# Patient Record
Sex: FEMALE
[De-identification: names, ages, dates, MRNs, and addresses within clinical notes are randomized per-mention and may not be internally consistent; named-entity substitution may affect disease eponyms.]

---
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
## 2020-09-11 ENCOUNTER — APPOINTMENT (OUTPATIENT)
Dept: PEDIATRICS | Facility: CLINIC | Age: 20
End: 2020-09-11

## 2020-10-25 ENCOUNTER — TRANSCRIPTION ENCOUNTER (OUTPATIENT)
Age: 20
End: 2020-10-25

## 2020-11-09 ENCOUNTER — APPOINTMENT (OUTPATIENT)
Dept: FAMILY MEDICINE | Facility: CLINIC | Age: 20
End: 2020-11-09

## 2020-12-16 PROBLEM — Z01.419 ENCOUNTER FOR ANNUAL ROUTINE GYNECOLOGICAL EXAMINATION: Status: RESOLVED | Noted: 2018-06-01 | Resolved: 2020-12-16

## 2023-05-18 ENCOUNTER — APPOINTMENT (OUTPATIENT)
Dept: DERMATOLOGY | Facility: CLINIC | Age: 23
End: 2023-05-18
Payer: COMMERCIAL

## 2023-05-18 PROCEDURE — 99202 OFFICE O/P NEW SF 15 MIN: CPT

## 2023-05-18 RX ORDER — DESOGESTREL AND ETHINYL ESTRADIOL 0.15-0.03
0.15-3 KIT ORAL DAILY
Qty: 3 | Refills: 3 | Status: DISCONTINUED | COMMUNITY
Start: 2018-06-01 | End: 2023-05-18

## 2023-05-18 RX ORDER — CLINDAMYCIN PHOSPHATE 10 MG/ML
1 LOTION TOPICAL
Qty: 60 | Refills: 0 | Status: DISCONTINUED | COMMUNITY
Start: 2018-04-11 | End: 2023-05-18

## 2023-05-18 RX ORDER — NORGESTIMATE AND ETHINYL ESTRADIOL 7DAYSX3 28
0.18/0.215/0.25 KIT ORAL DAILY
Qty: 3 | Refills: 3 | Status: DISCONTINUED | COMMUNITY
Start: 2018-09-11 | End: 2023-05-18

## 2023-05-18 NOTE — PHYSICAL EXAM
[Alert] : alert [Oriented x 3] : ~L oriented x 3 [Well Nourished] : well nourished [FreeTextEntry3] : Type II skin\par \par Posterior neck: 5 x 3 soft skin colored freely movable mass with overlying changes of acanthosis nigricans

## 2023-05-18 NOTE — HISTORY OF PRESENT ILLNESS
[FreeTextEntry1] : Bump on neck [de-identified] : First visit for 22-year-old white female presenting with a several month history of a "cyst" on the right posterior neck.  Periodically enlarges and becomes tender.\par \par Note: Patient treated with a course of Accutane in 2022 with improvement of her acne.

## 2023-05-19 ENCOUNTER — NON-APPOINTMENT (OUTPATIENT)
Age: 23
End: 2023-05-19

## 2023-08-08 ENCOUNTER — APPOINTMENT (OUTPATIENT)
Dept: PLASTIC SURGERY | Facility: CLINIC | Age: 23
End: 2023-08-08
Payer: COMMERCIAL

## 2023-08-08 VITALS
SYSTOLIC BLOOD PRESSURE: 130 MMHG | HEIGHT: 62.5 IN | RESPIRATION RATE: 14 BRPM | BODY MASS INDEX: 30.5 KG/M2 | DIASTOLIC BLOOD PRESSURE: 80 MMHG | OXYGEN SATURATION: 98 % | HEART RATE: 92 BPM | WEIGHT: 170 LBS | TEMPERATURE: 97.7 F

## 2023-08-08 PROCEDURE — 99203 OFFICE O/P NEW LOW 30 MIN: CPT

## 2023-08-16 NOTE — HISTORY OF PRESENT ILLNESS
[FreeTextEntry1] : 23 yo female presents to office for consult regarding posterior neck mass and further treatment options.  Ms. FABIÁN VELAZQUEZ is a 22 old female with no past medical history who presents to discuss a mass on her posterior neck which has been present for many months. She feels the mass has been slowly growing and becoming increasingly more painful. She is worried about its etiology and would like to discuss excision.  non smoker no anticoagulation or bleeding disorders

## 2023-08-16 NOTE — ASSESSMENT
[FreeTextEntry1] : Ms. FABIÁN VELAZQUEZ is a 23 yo female presenting for a consultation for evaluation of a posterior neck mass  The patient is scheduled for neoplasm extirpation. I explained that following extirpation there will be a full thickness defect of the involved area. The reconstructive options will be based on the defect size and surrounding tissue laxity of the involved area. Primary closure is only possible for smaller defects. For larger defects, local tissue rearrangement or skin grafting may be necessary. The patient was explained the risks of each option. Risks following layered primary closure or local tissue rearrangement include wound dehiscence, contour irregularity, bleeding, infection, and parasthesia. Risks following skin grafting include wound dehiscence, skin graft nonadherence (partial or complete), contour irregularity, bleeding, infection, parasthesia, and donor site complications.  The patient understands all the risks and has provided informed consent all patient's questions were answered throughout the visit

## 2024-01-23 ENCOUNTER — APPOINTMENT (OUTPATIENT)
Dept: PLASTIC SURGERY | Facility: CLINIC | Age: 24
End: 2024-01-23
Payer: COMMERCIAL

## 2024-01-23 VITALS
SYSTOLIC BLOOD PRESSURE: 127 MMHG | BODY MASS INDEX: 28.71 KG/M2 | HEIGHT: 62.5 IN | WEIGHT: 160 LBS | OXYGEN SATURATION: 96 % | TEMPERATURE: 98.9 F | HEART RATE: 99 BPM | DIASTOLIC BLOOD PRESSURE: 79 MMHG

## 2024-01-23 DIAGNOSIS — D17.0 BENIGN LIPOMATOUS NEOPLASM OF SKIN AND SUBCUTANEOUS TISSUE OF HEAD, FACE AND NECK: ICD-10-CM

## 2024-01-23 PROCEDURE — 99214 OFFICE O/P EST MOD 30 MIN: CPT

## 2024-02-05 ENCOUNTER — APPOINTMENT (OUTPATIENT)
Dept: PLASTIC SURGERY | Facility: AMBULATORY SURGERY CENTER | Age: 24
End: 2024-02-05

## 2024-02-05 PROBLEM — D17.0 LIPOMA OF SKIN AND SUBCUTANEOUS TISSUE OF NECK: Status: ACTIVE | Noted: 2023-05-18

## 2024-02-05 NOTE — HISTORY OF PRESENT ILLNESS
[FreeTextEntry1] : 22 yo female presents to office for consult regarding posterior neck mass and further treatment options. Ms. FABIÁN VELAZQUEZ is a 23 old female with no past medical history who presents to discuss a mass on her posterior neck which has been present for many months. She feels the mass has been slowly growing and becoming increasingly more painful. She is worried about its etiology and would like to discuss excision. She explains that it has recently became more painful, and has "popped" and is now draining material/fluid. She currently has a surgical date for excision 2/5/24  non smoker no anticoagulation or bleeding disorders

## 2024-02-05 NOTE — ASSESSMENT
[FreeTextEntry1] : Ms. FABIÁN VELAZQUEZ is a 22 yo female presenting for a consultation for evaluation of a posterior neck mass  Instructed the patient to keep area clean and dry, and to allow for more healing prior to continuing with surgery. Her current surgical date will be postponed until fully healed. monitor for redness, swelling, fever, chills she is encouraged to call if there are any changes RTO after her trip for follow up appointment  all patient's questions were answered throughout the visit.

## 2024-02-29 ENCOUNTER — APPOINTMENT (OUTPATIENT)
Dept: PLASTIC SURGERY | Facility: CLINIC | Age: 24
End: 2024-02-29

## 2024-03-14 ENCOUNTER — APPOINTMENT (OUTPATIENT)
Dept: PLASTIC SURGERY | Facility: CLINIC | Age: 24
End: 2024-03-14
Payer: COMMERCIAL

## 2024-03-14 PROCEDURE — 99214 OFFICE O/P EST MOD 30 MIN: CPT

## 2024-03-14 NOTE — ASSESSMENT
[FreeTextEntry1] : Ms. FABIÁN VEALZQUEZ is a 22 yo female presenting for a consultation for evaluation of a posterior neck mass  Patient is well healed and we may proceed with excising the neck mass.   The patient is scheduled for neoplasm extirpation.  I explained that following extirpation there will be a full thickness defect of the involved area.  The reconstructive options will be based on the defect size and surrounding tissue laxity of the involved area.  Primary closure is only possible for smaller defects.  For larger defects, local tissue rearrangement or skin grafting may be necessary.  The patient was explained the risks of each option. Risks following layered primary closure or local tissue rearrangement include wound dehiscence, contour irregularity, bleeding, infection, and parasthesia.  Risks following skin grafting include wound dehiscence, skin graft nonadherence (partial or complete), contour irregularity, bleeding, infection, parasthesia, and donor site complications.   The patient understands all the risks and has provided informed consent

## 2024-03-14 NOTE — HISTORY OF PRESENT ILLNESS
[FreeTextEntry1] : 24 yo female presents to office for consult regarding posterior neck mass and further treatment options. Ms. FABIÁN VELAZQUEZ is a 23 old female with no past medical history who presents to discuss a mass on her posterior neck which has been present for many months. She feels the mass has been slowly growing and becoming increasingly more painful. She is worried about its etiology and would like to discuss excision. She explains that it has recently became more painful, and has "popped" and is now draining material/fluid. She currently has a surgical date for excision 2/5/24 that was cancelled due to her mass needing additional time to heal  She presents today to discuss surgical excision  non smoker no anticoagulation or bleeding disorders

## 2024-04-11 RX ORDER — OXYCODONE 5 MG/1
5 TABLET ORAL
Qty: 10 | Refills: 0 | Status: ACTIVE | COMMUNITY
Start: 2024-04-11 | End: 1900-01-01

## 2024-04-14 ENCOUNTER — RESULT REVIEW (OUTPATIENT)
Age: 24
End: 2024-04-14

## 2024-04-15 ENCOUNTER — APPOINTMENT (OUTPATIENT)
Dept: PLASTIC SURGERY | Facility: AMBULATORY SURGERY CENTER | Age: 24
End: 2024-04-15
Payer: COMMERCIAL

## 2024-04-15 PROCEDURE — 21554 EXC NECK TUM DEEP 5 CM/>: CPT

## 2024-04-15 PROCEDURE — 14041 TIS TRNFR F/C/C/M/N/A/G/H/F: CPT

## 2024-04-25 ENCOUNTER — APPOINTMENT (OUTPATIENT)
Dept: PLASTIC SURGERY | Facility: CLINIC | Age: 24
End: 2024-04-25
Payer: COMMERCIAL

## 2024-04-25 VITALS
OXYGEN SATURATION: 97 % | SYSTOLIC BLOOD PRESSURE: 124 MMHG | DIASTOLIC BLOOD PRESSURE: 78 MMHG | TEMPERATURE: 98.2 F | HEART RATE: 103 BPM

## 2024-04-25 DIAGNOSIS — L72.9 FOLLICULAR CYST OF THE SKIN AND SUBCUTANEOUS TISSUE, UNSPECIFIED: ICD-10-CM

## 2024-04-25 PROCEDURE — 99024 POSTOP FOLLOW-UP VISIT: CPT

## 2024-04-25 NOTE — PHYSICAL EXAM
[NI] : Normal [de-identified] : incisions clean no signs of infection drainage noted from lateral incision site with narrow dehiscence measuring 0.5 cm wide x 2 cm long  superior circular wound above larger incision site measuring 1 cm x 1cm, overlying scab no palpable fluid collections

## 2024-04-25 NOTE — ASSESSMENT
[FreeTextEntry1] : Ms. FABIÁN VELAZQUEZ is a 22 yo female presenting for a consultation for evaluation of a posterior neck mass  Patient is well; seen and examined by Dr. Menjivar Instructed the patient to keep area clean and dry, and to replace gauze dressings twice daily.  monitor for redness, swelling, fever, chills she is encouraged to call if there are any changes RTO 2 weeks all patient's questions were answered throughout the visit.

## 2024-04-25 NOTE — HISTORY OF PRESENT ILLNESS
[FreeTextEntry1] : 22 yo female presents to office for consult regarding posterior neck mass and further treatment options. Ms. FABIÁN VELAZQUEZ is a 23 old female with no past medical history who presents to discuss a mass on her posterior neck which has been present for many months. She feels the mass has been slowly growing and becoming increasingly more painful. She is worried about its etiology and would like to discuss excision. She explains that it has recently became more painful, and has "popped" and is now draining material/fluid. She currently has a surgical date for excision 2/5/24 that was cancelled due to her mass needing additional time to heal  She contacted the office yesterday with concerns of "drainage" from her incision sites. She denies having any fevers, chills, nausea or diarrhea. She is here for her first post-op visit and evaluation   non smoker no anticoagulation or bleeding disorders

## 2024-04-30 ENCOUNTER — APPOINTMENT (OUTPATIENT)
Dept: PLASTIC SURGERY | Facility: CLINIC | Age: 24
End: 2024-04-30
Payer: COMMERCIAL

## 2024-04-30 VITALS
OXYGEN SATURATION: 97 % | HEART RATE: 113 BPM | DIASTOLIC BLOOD PRESSURE: 89 MMHG | TEMPERATURE: 98.7 F | SYSTOLIC BLOOD PRESSURE: 135 MMHG

## 2024-04-30 PROCEDURE — 99024 POSTOP FOLLOW-UP VISIT: CPT

## 2024-04-30 NOTE — HISTORY OF PRESENT ILLNESS
[FreeTextEntry1] : 24 yo female presents to office for consult regarding posterior neck mass and further treatment options. Ms. FABIÁN VELAZQUEZ is a 23 old female with no past medical history who presents to discuss a mass on her posterior neck which has been present for many months. She feels the mass has been slowly growing and becoming increasingly more painful. She is worried about its etiology and would like to discuss excision. She explains that it has recently became more painful, and has "popped" and is now draining material/fluid. She currently has a surgical date for excision 2/5/24 that was cancelled due to her mass needing additional time to heal  She admits to the incision separation appearing "larger" and has still had drainage. She denies having any fevers, chills, nausea or diarrhea.  non smoker no anticoagulation or bleeding disorders

## 2024-04-30 NOTE — ASSESSMENT
[FreeTextEntry1] : Ms. FABIÁN VELAZQUEZ is a 22 yo female presenting for a consultation for evaluation of a posterior neck mass  Patient is well Instructed the patient to keep area clean and dry, and to perform wet to dry dressings twice daily Discussed the next steps in the process of wound healing, which includes medihoney application to area of concern Supplies given to patient. She may switch from wet to dry dressings  monitor for redness, swelling, fever, chills she is encouraged to call if there are any changes RTO 1 w all patient's questions were answered throughout the visit.

## 2024-04-30 NOTE — PHYSICAL EXAM
[NI] : Normal [de-identified] : incisions clean no signs of infection drainage noted from lateral incision site with dehiscence measuring 5 cm x 4 cm with granulation tissue noted at the base superior circular wound superior to larger incision site measuring 1 cm x 1cm no palpable fluid collections

## 2024-05-09 ENCOUNTER — APPOINTMENT (OUTPATIENT)
Dept: PLASTIC SURGERY | Facility: CLINIC | Age: 24
End: 2024-05-09
Payer: COMMERCIAL

## 2024-05-09 VITALS
HEIGHT: 62.5 IN | SYSTOLIC BLOOD PRESSURE: 128 MMHG | WEIGHT: 160 LBS | HEART RATE: 102 BPM | BODY MASS INDEX: 28.71 KG/M2 | OXYGEN SATURATION: 96 % | DIASTOLIC BLOOD PRESSURE: 84 MMHG

## 2024-05-09 PROCEDURE — 99024 POSTOP FOLLOW-UP VISIT: CPT

## 2024-05-15 NOTE — HISTORY OF PRESENT ILLNESS
[FreeTextEntry1] : Ms. FABIÁN VELAZQUEZ is a 23 year old female with past medical history of posterior neck mass who presents now s/p excision of neck mass 4/15/24.  She subsequently had dehiscence of her neck incision.  She has been doing wet to dry dressings twice daily, she presents for wound check   Denies fever, chills, LE pain/edema

## 2024-05-15 NOTE — ASSESSMENT
[FreeTextEntry1] : 22 yo female s/p neck mass excision 4/15/24 doing ok discussed to continue wet to dry dressing changes daily after showers monitor for redness, swelling, fever, chills she is encouraged to call if there are any changes all pt questions answered

## 2024-05-15 NOTE — PHYSICAL EXAM
[NI] : Normal [de-identified] : posterior neck incision with cental dehiscence measuring 3 cm x 1 cm, minimal fibrin at base, no surrounding signs of infection minimal drainage noted

## 2024-05-16 ENCOUNTER — APPOINTMENT (OUTPATIENT)
Dept: PLASTIC SURGERY | Facility: CLINIC | Age: 24
End: 2024-05-16
Payer: COMMERCIAL

## 2024-05-16 VITALS
DIASTOLIC BLOOD PRESSURE: 80 MMHG | SYSTOLIC BLOOD PRESSURE: 128 MMHG | HEART RATE: 106 BPM | TEMPERATURE: 98.1 F | OXYGEN SATURATION: 98 %

## 2024-05-16 PROCEDURE — 99024 POSTOP FOLLOW-UP VISIT: CPT

## 2024-05-16 NOTE — ASSESSMENT
[FreeTextEntry1] : 22 yo female s/p neck mass excision 4/15/24 doing well silver nitrate applied to hyper granulation tissue discussed applying medi honey to wound daily after showers monitor for redness, swelling, fever, chills she is encouraged to call if there are any changes all pt questions answered

## 2024-05-16 NOTE — PHYSICAL EXAM
[NI] : Normal [de-identified] : posterior neck incision with cental dehiscence measuring 2 cm x 1 cm, hyper-granulation tissue, no surrounding signs of infection minimal drainage noted

## 2024-05-30 ENCOUNTER — APPOINTMENT (OUTPATIENT)
Dept: PLASTIC SURGERY | Facility: CLINIC | Age: 24
End: 2024-05-30
Payer: COMMERCIAL

## 2024-05-30 VITALS
TEMPERATURE: 98 F | DIASTOLIC BLOOD PRESSURE: 80 MMHG | WEIGHT: 160 LBS | SYSTOLIC BLOOD PRESSURE: 120 MMHG | HEIGHT: 62 IN | BODY MASS INDEX: 29.44 KG/M2 | HEART RATE: 110 BPM | OXYGEN SATURATION: 96 %

## 2024-05-30 DIAGNOSIS — R22.1 LOCALIZED SWELLING, MASS AND LUMP, NECK: ICD-10-CM

## 2024-05-30 PROCEDURE — 99024 POSTOP FOLLOW-UP VISIT: CPT

## 2024-05-30 NOTE — ASSESSMENT
Care of patient assummed   [FreeTextEntry1] : 24 yo female s/p neck mass excision 4/15/24  doing well medihoney applied to healing wound and covered with gauze discussed continuing to apply medi honey to wound daily after showers monitor for redness, swelling, fever, chills she is encouraged to call if there are any changes RTO 2 weeks for final check all pt questions answered.

## 2024-05-30 NOTE — PHYSICAL EXAM
[de-identified] : posterior neck incision with cental dehiscence measuring 1 cm x 1 cm, no surrounding signs of infection minimal drainage noted

## 2024-06-13 ENCOUNTER — APPOINTMENT (OUTPATIENT)
Dept: PLASTIC SURGERY | Facility: CLINIC | Age: 24
End: 2024-06-13

## 2025-08-28 ENCOUNTER — NON-APPOINTMENT (OUTPATIENT)
Age: 25
End: 2025-08-28

## 2025-08-28 ENCOUNTER — APPOINTMENT (OUTPATIENT)
Dept: ENDOCRINOLOGY | Facility: CLINIC | Age: 25
End: 2025-08-28
Payer: MEDICAID

## 2025-08-28 VITALS
HEART RATE: 112 BPM | OXYGEN SATURATION: 98 % | SYSTOLIC BLOOD PRESSURE: 118 MMHG | HEIGHT: 62 IN | WEIGHT: 181 LBS | BODY MASS INDEX: 33.31 KG/M2 | TEMPERATURE: 97.6 F | RESPIRATION RATE: 14 BRPM | DIASTOLIC BLOOD PRESSURE: 78 MMHG

## 2025-08-28 DIAGNOSIS — R79.89 OTHER SPECIFIED ABNORMAL FINDINGS OF BLOOD CHEMISTRY: ICD-10-CM

## 2025-08-28 DIAGNOSIS — N92.6 IRREGULAR MENSTRUATION, UNSPECIFIED: ICD-10-CM

## 2025-08-28 DIAGNOSIS — Z83.3 FAMILY HISTORY OF DIABETES MELLITUS: ICD-10-CM

## 2025-08-28 DIAGNOSIS — L70.9 ACNE, UNSPECIFIED: ICD-10-CM

## 2025-08-28 DIAGNOSIS — E66.9 OBESITY, UNSPECIFIED: ICD-10-CM

## 2025-08-28 PROCEDURE — 99204 OFFICE O/P NEW MOD 45 MIN: CPT

## 2025-08-28 RX ORDER — TIRZEPATIDE 5 MG/.5ML
5 INJECTION, SOLUTION SUBCUTANEOUS
Qty: 1 | Refills: 3 | Status: ACTIVE | COMMUNITY
Start: 2025-08-28 | End: 1900-01-01

## 2025-08-28 RX ORDER — TIRZEPATIDE 2.5 MG/.5ML
2.5 INJECTION, SOLUTION SUBCUTANEOUS
Qty: 1 | Refills: 0 | Status: ACTIVE | COMMUNITY
Start: 2025-08-28 | End: 1900-01-01

## 2025-08-31 LAB
ALBUMIN SERPL ELPH-MCNC: 4.3 G/DL
ALP BLD-CCNC: 57 U/L
ALT SERPL-CCNC: 16 U/L
ANION GAP SERPL CALC-SCNC: 12 MMOL/L
AST SERPL-CCNC: 16 U/L
BASOPHILS # BLD AUTO: 0.03 K/UL
BASOPHILS NFR BLD AUTO: 0.4 %
BILIRUB SERPL-MCNC: 0.4 MG/DL
BUN SERPL-MCNC: 11 MG/DL
CALCIUM SERPL-MCNC: 9.2 MG/DL
CHLORIDE SERPL-SCNC: 106 MMOL/L
CHOLEST SERPL-MCNC: 130 MG/DL
CO2 SERPL-SCNC: 22 MMOL/L
CREAT SERPL-MCNC: 0.68 MG/DL
EGFRCR SERPLBLD CKD-EPI 2021: 124 ML/MIN/1.73M2
EOSINOPHIL # BLD AUTO: 0.22 K/UL
EOSINOPHIL NFR BLD AUTO: 3.1 %
ESTIMATED AVERAGE GLUCOSE: 108 MG/DL
GLUCOSE SERPL-MCNC: 86 MG/DL
HBA1C MFR BLD HPLC: 5.4 %
HCT VFR BLD CALC: 38.8 %
HDLC SERPL-MCNC: 35 MG/DL
HGB BLD-MCNC: 12.2 G/DL
IMM GRANULOCYTES NFR BLD AUTO: 0.3 %
LDLC SERPL-MCNC: 76 MG/DL
LYMPHOCYTES # BLD AUTO: 2.37 K/UL
LYMPHOCYTES NFR BLD AUTO: 33.2 %
MAN DIFF?: NORMAL
MCHC RBC-ENTMCNC: 25.7 PG
MCHC RBC-ENTMCNC: 31.4 G/DL
MCV RBC AUTO: 81.7 FL
MONOCYTES # BLD AUTO: 0.33 K/UL
MONOCYTES NFR BLD AUTO: 4.6 %
NEUTROPHILS # BLD AUTO: 4.17 K/UL
NEUTROPHILS NFR BLD AUTO: 58.4 %
NONHDLC SERPL-MCNC: 95 MG/DL
PLATELET # BLD AUTO: 288 K/UL
POTASSIUM SERPL-SCNC: 4.2 MMOL/L
PROT SERPL-MCNC: 7 G/DL
RBC # BLD: 4.75 M/UL
RBC # FLD: 14.3 %
SODIUM SERPL-SCNC: 140 MMOL/L
THYROGLOB AB SERPL-ACNC: 19.8 IU/ML
THYROPEROXIDASE AB SERPL IA-ACNC: 327 IU/ML
TRIGL SERPL-MCNC: 100 MG/DL
TSH RECEPTOR AB: <1.1 IU/L
TSH SERPL-ACNC: 3.95 UIU/ML
WBC # FLD AUTO: 7.14 K/UL

## 2025-09-02 LAB
TESTOST FREE SERPL-MCNC: 8.4 PG/ML
TESTOST SERPL-MCNC: 26.4 NG/DL

## 2025-09-03 LAB
17OHP SERPL-MCNC: 58 NG/DL
TSI ACT/NOR SER: <0.1 IU/L

## 2025-09-05 LAB — CORTIS SAL-MCNC: NORMAL
